# Patient Record
Sex: MALE | Race: WHITE | NOT HISPANIC OR LATINO | Employment: FULL TIME | URBAN - NONMETROPOLITAN AREA
[De-identification: names, ages, dates, MRNs, and addresses within clinical notes are randomized per-mention and may not be internally consistent; named-entity substitution may affect disease eponyms.]

---

## 2017-06-03 ENCOUNTER — HOSPITAL ENCOUNTER (EMERGENCY)
Facility: HOSPITAL | Age: 37
Discharge: HOME | End: 2017-06-03
Attending: FAMILY MEDICINE | Admitting: FAMILY MEDICINE

## 2017-06-03 VITALS
HEIGHT: 70 IN | BODY MASS INDEX: 32.21 KG/M2 | DIASTOLIC BLOOD PRESSURE: 104 MMHG | HEART RATE: 80 BPM | RESPIRATION RATE: 20 BRPM | OXYGEN SATURATION: 99 % | WEIGHT: 225 LBS | TEMPERATURE: 98.1 F | SYSTOLIC BLOOD PRESSURE: 150 MMHG

## 2017-06-03 DIAGNOSIS — I10 ESSENTIAL HYPERTENSION: Primary | ICD-10-CM

## 2017-06-03 LAB
ALBUMIN SERPL-MCNC: 4.9 G/DL (ref 3.5–5)
ALBUMIN/GLOB SERPL: 1.3 G/DL (ref 1.5–2.5)
ALP SERPL-CCNC: 88 U/L (ref 40–129)
ALT SERPL W P-5'-P-CCNC: 40 U/L (ref 10–44)
ANION GAP SERPL CALCULATED.3IONS-SCNC: 5.2 MMOL/L (ref 3.6–11.2)
AST SERPL-CCNC: 33 U/L (ref 10–34)
BASOPHILS # BLD AUTO: 0.24 10*3/MM3 (ref 0–0.3)
BASOPHILS NFR BLD AUTO: 2.2 % (ref 0–2)
BILIRUB SERPL-MCNC: 0.4 MG/DL (ref 0.2–1.8)
BNP SERPL-MCNC: <2 PG/ML (ref 0–100)
BUN BLD-MCNC: 15 MG/DL (ref 7–21)
BUN/CREAT SERPL: 12.6 (ref 7–25)
CALCIUM SPEC-SCNC: 9.9 MG/DL (ref 7.7–10)
CHLORIDE SERPL-SCNC: 108 MMOL/L (ref 99–112)
CO2 SERPL-SCNC: 26.8 MMOL/L (ref 24.3–31.9)
CREAT BLD-MCNC: 1.19 MG/DL (ref 0.43–1.29)
DEPRECATED RDW RBC AUTO: 42.7 FL (ref 37–54)
EOSINOPHIL # BLD AUTO: 0.44 10*3/MM3 (ref 0–0.7)
EOSINOPHIL NFR BLD AUTO: 4 % (ref 0–5)
ERYTHROCYTE [DISTWIDTH] IN BLOOD BY AUTOMATED COUNT: 13.5 % (ref 11.5–14.5)
GFR SERPL CREATININE-BSD FRML MDRD: 70 ML/MIN/1.73
GLOBULIN UR ELPH-MCNC: 3.7 GM/DL
GLUCOSE BLD-MCNC: 132 MG/DL (ref 70–110)
HCT VFR BLD AUTO: 49.4 % (ref 42–52)
HGB BLD-MCNC: 16.7 G/DL (ref 14–18)
IMM GRANULOCYTES # BLD: 0.1 10*3/MM3 (ref 0–0.03)
IMM GRANULOCYTES NFR BLD: 0.9 % (ref 0–0.5)
LYMPHOCYTES # BLD AUTO: 4.19 10*3/MM3 (ref 1–3)
LYMPHOCYTES NFR BLD AUTO: 38.1 % (ref 21–51)
MCH RBC QN AUTO: 29.2 PG (ref 27–33)
MCHC RBC AUTO-ENTMCNC: 33.8 G/DL (ref 33–37)
MCV RBC AUTO: 86.5 FL (ref 80–94)
MONOCYTES # BLD AUTO: 1.01 10*3/MM3 (ref 0.1–0.9)
MONOCYTES NFR BLD AUTO: 9.2 % (ref 0–10)
NEUTROPHILS # BLD AUTO: 5.02 10*3/MM3 (ref 1.4–6.5)
NEUTROPHILS NFR BLD AUTO: 45.6 % (ref 30–70)
OSMOLALITY SERPL CALC.SUM OF ELEC: 282.1 MOSM/KG (ref 273–305)
PLATELET # BLD AUTO: 328 10*3/MM3 (ref 130–400)
PMV BLD AUTO: 9.3 FL (ref 6–10)
POTASSIUM BLD-SCNC: 4.3 MMOL/L (ref 3.5–5.3)
PROT SERPL-MCNC: 8.6 G/DL (ref 6–8)
RBC # BLD AUTO: 5.71 10*6/MM3 (ref 4.7–6.1)
SODIUM BLD-SCNC: 140 MMOL/L (ref 135–153)
TROPONIN I SERPL-MCNC: <0.006 NG/ML
WBC NRBC COR # BLD: 11 10*3/MM3 (ref 4.5–12.5)

## 2017-06-03 PROCEDURE — 84484 ASSAY OF TROPONIN QUANT: CPT | Performed by: FAMILY MEDICINE

## 2017-06-03 PROCEDURE — 96374 THER/PROPH/DIAG INJ IV PUSH: CPT

## 2017-06-03 PROCEDURE — 80053 COMPREHEN METABOLIC PANEL: CPT | Performed by: FAMILY MEDICINE

## 2017-06-03 PROCEDURE — 25010000002 HYDRALAZINE PER 20 MG: Performed by: FAMILY MEDICINE

## 2017-06-03 PROCEDURE — 83880 ASSAY OF NATRIURETIC PEPTIDE: CPT | Performed by: FAMILY MEDICINE

## 2017-06-03 PROCEDURE — 99283 EMERGENCY DEPT VISIT LOW MDM: CPT

## 2017-06-03 PROCEDURE — 93010 ELECTROCARDIOGRAM REPORT: CPT | Performed by: INTERNAL MEDICINE

## 2017-06-03 PROCEDURE — 93005 ELECTROCARDIOGRAM TRACING: CPT | Performed by: FAMILY MEDICINE

## 2017-06-03 PROCEDURE — 85025 COMPLETE CBC W/AUTO DIFF WBC: CPT | Performed by: FAMILY MEDICINE

## 2017-06-03 RX ORDER — AMLODIPINE BESYLATE 5 MG/1
10 TABLET ORAL ONCE
Status: COMPLETED | OUTPATIENT
Start: 2017-06-03 | End: 2017-06-03

## 2017-06-03 RX ORDER — HYDROCHLOROTHIAZIDE 25 MG/1
25 TABLET ORAL DAILY
Qty: 30 TABLET | Refills: 0 | Status: SHIPPED | OUTPATIENT
Start: 2017-06-03 | End: 2017-07-03

## 2017-06-03 RX ORDER — HYDROCHLOROTHIAZIDE 25 MG/1
25 TABLET ORAL ONCE
Status: DISCONTINUED | OUTPATIENT
Start: 2017-06-03 | End: 2017-06-03

## 2017-06-03 RX ORDER — HYDRALAZINE HYDROCHLORIDE 20 MG/ML
10 INJECTION INTRAMUSCULAR; INTRAVENOUS ONCE
Status: COMPLETED | OUTPATIENT
Start: 2017-06-03 | End: 2017-06-03

## 2017-06-03 RX ORDER — SODIUM CHLORIDE 0.9 % (FLUSH) 0.9 %
10 SYRINGE (ML) INJECTION AS NEEDED
Status: DISCONTINUED | OUTPATIENT
Start: 2017-06-03 | End: 2017-06-03 | Stop reason: HOSPADM

## 2017-06-03 RX ADMIN — AMLODIPINE BESYLATE 10 MG: 5 TABLET ORAL at 01:16

## 2017-06-03 RX ADMIN — HYDRALAZINE HYDROCHLORIDE 10 MG: 20 INJECTION INTRAMUSCULAR; INTRAVENOUS at 01:14

## 2017-06-03 NOTE — ED PROVIDER NOTES
Subjective   History of Present Illness  36 y/o M here w/ c/o HTN. Pt has no h/o elevated BP in the past. Pt states that he has been under tremendous stress the past few weeks. Pt denies any MONACO or CP. Pt denies any health problems.   Review of Systems   HENT: Negative for trouble swallowing and voice change.    Eyes: Negative for photophobia and visual disturbance.   Respiratory: Negative for cough, chest tightness, shortness of breath and wheezing.    Cardiovascular: Negative for chest pain and palpitations.   Gastrointestinal: Negative for abdominal distention.   Genitourinary: Negative for difficulty urinating.   Musculoskeletal: Negative for neck pain and neck stiffness.   Skin: Negative for color change and pallor.   Neurological: Negative for syncope, facial asymmetry, speech difficulty, weakness, light-headedness, numbness and headaches.       No past medical history on file.    No Known Allergies    No past surgical history on file.    No family history on file.    Social History     Social History   • Marital status:      Spouse name: N/A   • Number of children: N/A   • Years of education: N/A     Social History Main Topics   • Smoking status: Not on file   • Smokeless tobacco: Not on file   • Alcohol use Not on file   • Drug use: Not on file   • Sexual activity: Not on file     Other Topics Concern   • Not on file     Social History Narrative           Objective   Physical Exam   Constitutional: He is oriented to person, place, and time. He appears well-developed and well-nourished. He is active.   HENT:   Head: Normocephalic and atraumatic.   Right Ear: Hearing and external ear normal.   Left Ear: Hearing and external ear normal.   Nose: Nose normal.   Mouth/Throat: Uvula is midline and oropharynx is clear and moist.   Eyes: Conjunctivae, EOM and lids are normal. Pupils are equal, round, and reactive to light.   Neck: Trachea normal, normal range of motion, full passive range of motion without pain  and phonation normal. Neck supple.   Cardiovascular: Normal rate, regular rhythm and normal heart sounds.    Pulmonary/Chest: Effort normal and breath sounds normal.   Abdominal: Soft. Normal appearance.   Neurological: He is alert and oriented to person, place, and time.   Skin: Skin is warm, dry and intact.   Psychiatric: He has a normal mood and affect. His speech is normal and behavior is normal. Judgment and thought content normal. Cognition and memory are normal.   Nursing note and vitals reviewed.      Procedures         ED Course  ED Course      Manual recheck of pt's BP was 200/112 after he was placed in a room. Pt BP recheck was 147/102. Pt tolerated medication well. Pt given prescription for HCTZ and instructions to check BP at home and f/u w/ PCP.            MDM  Number of Diagnoses or Management Options  Essential hypertension: new and requires workup     Amount and/or Complexity of Data Reviewed  Clinical lab tests: ordered and reviewed  Independent visualization of images, tracings, or specimens: yes    Risk of Complications, Morbidity, and/or Mortality  Presenting problems: high  Diagnostic procedures: high  Management options: high    Patient Progress  Patient progress: stable      Final diagnoses:   Essential hypertension            Jaguar Ellison MD  06/03/17 0130

## 2022-01-12 ENCOUNTER — OFFICE VISIT (OUTPATIENT)
Dept: UROLOGY | Facility: CLINIC | Age: 42
End: 2022-01-12

## 2022-01-12 VITALS
DIASTOLIC BLOOD PRESSURE: 92 MMHG | HEIGHT: 70 IN | SYSTOLIC BLOOD PRESSURE: 146 MMHG | WEIGHT: 210 LBS | BODY MASS INDEX: 30.06 KG/M2

## 2022-01-12 DIAGNOSIS — Z30.09 STERILIZATION CONSULT: Primary | ICD-10-CM

## 2022-01-12 PROCEDURE — 99203 OFFICE O/P NEW LOW 30 MIN: CPT | Performed by: UROLOGY

## 2022-01-12 NOTE — PROGRESS NOTES
Office Note Vasectomy Consult     Patient Name: Juan Antonio Smiley  : 1980   MRN: [id number]     Chief Complaint:  Elective Sterilization.   Chief Complaint   Patient presents with   • Vasectomy consult     New pt       Referring Provider: Referring, Self    History of Present Illness: Juan Antonio Smiley is a 41 y.o. male who presents to Urology today with the desire for irreversible, elective sterilization.  and has 1 child who is 8 years old.  She currently uses BC pills now for prophylaxis.    Has never had scrotal surgery.  No scrotal pain or discomfort.  NO hx of inguinal hernia repair.   No LUTS.  No ED.      Subjective      Review of Systems: Review of Systems   Constitutional: Negative for chills and fever.   HENT: Negative for sinus pain.    Respiratory: Negative for cough.    Cardiovascular: Negative for leg swelling.   Gastrointestinal: Negative for abdominal pain.   Genitourinary: Negative for dysuria, frequency, penile pain, penile swelling, scrotal swelling, testicular pain and urgency.   Musculoskeletal: Negative for back pain.   Neurological: Negative for headaches.   Psychiatric/Behavioral: The patient is not nervous/anxious.       I have reviewed the ROS documented by my clinical staff, I have updated appropriately and I agree. Kaz Solis MD    Past Medical History:   Past Medical History:   Diagnosis Date   • Hypertension        Past Surgical History:   Past Surgical History:   Procedure Laterality Date   • WISDOM TOOTH EXTRACTION         Family History:   Family History   Problem Relation Age of Onset   • Heart disease Other    • Hypertension Other    • Diabetes Other    • Cancer Other    • Prostate cancer Neg Hx        Social History:   Social History     Socioeconomic History   • Marital status:    Tobacco Use   • Smoking status: Never Smoker   • Smokeless tobacco: Never Used   Substance and Sexual Activity   • Alcohol use: No   • Drug use: No   • Sexual activity: Never  "      Medications:   No current outpatient medications on file.    Allergies:   No Known Allergies    IPSS Questionnaire (AUA-7):  Over the past month…    1)  Incomplete Emptying  How often have you had a sensation of not emptying your bladder?  0 - Not at all   2)  Frequency  How often have you had to urinate less than every two hours? 0 - Not at all   3)  Intermittency  How often have you found you stopped and started again several times when you urinated?  0 - Not at all   4) Urgency  How often have you found it difficult to postpone urination?  0 - Not at all   5) Weak Stream  How often have you had a weak urinary stream?  0 - Not at all   6) Straining  How often have you had to push or strain to begin urination?  0 - Not at all   7) Nocturia  How many times did you typically get up at night to urinate?  0 - None   Total Score:  0       Quality of life due to urinary symptoms:  If you were to spend the rest of your life with your urinary condition the way it is now, how would you feel about that? 0-DelightedP   Urine Leakage (Incontinence) 0-No Leakage       Objective     Physical Exam:   Vital Signs:   Vitals:    01/12/22 0847   BP: 146/92   Weight: 95.3 kg (210 lb)   Height: 177.8 cm (70\")     Body mass index is 30.13 kg/m².     Physical Exam  Vitals and nursing note reviewed.   Constitutional:       General: He is awake. He is not in acute distress.     Appearance: Normal appearance. He is well-developed. He is obese.   HENT:      Head: Normocephalic and atraumatic.      Right Ear: External ear normal.      Left Ear: External ear normal.      Nose: Nose normal.   Eyes:      Conjunctiva/sclera: Conjunctivae normal.   Pulmonary:      Effort: Pulmonary effort is normal.   Abdominal:      General: There is no distension.      Palpations: Abdomen is soft. There is no mass.      Tenderness: There is no abdominal tenderness. There is no right CVA tenderness, left CVA tenderness, guarding or rebound.      Hernia: No " hernia is present. There is no hernia in the left inguinal area or right inguinal area.   Genitourinary:     Pubic Area: No rash.       Penis: Normal.       Testes: Normal.      Rectum: No mass or tenderness. Normal anal tone.   Musculoskeletal:      Cervical back: Normal range of motion.   Lymphadenopathy:      Lower Body: No right inguinal adenopathy. No left inguinal adenopathy.   Skin:     General: Skin is warm.   Neurological:      General: No focal deficit present.      Mental Status: He is alert and oriented to person, place, and time.   Psychiatric:         Behavior: Behavior normal. Behavior is cooperative.         Labs:   Brief Urine Lab Results     None               Lab Results   Component Value Date    GLUCOSE 132 (H) 06/03/2017    CALCIUM 9.9 06/03/2017     06/03/2017    K 4.3 06/03/2017    CO2 26.8 06/03/2017     06/03/2017    BUN 15 06/03/2017    CREATININE 1.19 06/03/2017    EGFRIFNONA 70 06/03/2017    BCR 12.6 06/03/2017    ANIONGAP 5.2 06/03/2017       Lab Results   Component Value Date    WBC 11.00 06/03/2017    HGB 16.7 06/03/2017    HCT 49.4 06/03/2017    MCV 86.5 06/03/2017     06/03/2017       Images:   No Images in the past 120 days found..    Measures:   Tobacco:   Juan Antonio Smiley  reports that he has never smoked. He has never used smokeless tobacco..      Urine Incontinence: Patient reports that he is not currently experiencing any symptoms of urinary incontinence.         Assessment / Plan      Assessment/Plan:   Mr. Smiley is a 41 y.o. male who presented to clinic today for irreversible elective sterilization.  He would like to have this procedure done in the operating room.  He is currently taking care of his mother who underwent surgery in he would like to delay for now.  I advised him to call us whenever he is ready to have his procedure scheduled and we will move forward.      Diagnoses and all orders for this visit:    1. Sterilization consult (Primary)          Patient Education:   The patient has requested a vasectomy for elective sterilization and the risks and benefits of the procedure were explained at length. The procedure itself was explained and postop followup explained at this point.  We discussed he will need a  to take him home. I extensively reviewed with him the likely postoperative recuperative period as well as the need to continue to use contraception until he is notified by me of his sterility.     He will undergo a semen analysis 3 months post-procedure AND 20 ejaculations before his first semen analysis check. He understands the potential side effects of anesthesia, bleeding, scrotal hematoma, wound infection, epididymo-orchitis, epididymal congestion, chronic testicular pain requiring further intervention/surgery, sperm granuloma, recanalization and risk of sperm return and/or pregnancy  (1 in 2000 as per the AUA guidelines). The patient wishes to proceed with vasectomy.     Follow Up:   Return for Recheck, Follow up after surgery.    I spent approximately 30 minutes providing clinical care for this patient; including review of patient's chart and provider documentation, face to face time spent with patient in examination room (obtaining history, performing physical exam, discussing diagnosis and management options), placing orders, and completing patient documentation.     Kaz Solis MD  [facility]

## 2022-01-28 DIAGNOSIS — Z30.2 ENCOUNTER FOR VASECTOMY: Primary | ICD-10-CM

## 2022-01-28 RX ORDER — CEFAZOLIN SODIUM 2 G/50ML
2 SOLUTION INTRAVENOUS ONCE
Status: CANCELLED | OUTPATIENT
Start: 2022-01-28 | End: 2022-01-28

## 2022-01-31 DIAGNOSIS — Z30.09 STERILIZATION CONSULT: ICD-10-CM

## 2022-01-31 DIAGNOSIS — Z30.2 ENCOUNTER FOR VASECTOMY: Primary | ICD-10-CM

## 2022-02-07 DIAGNOSIS — Z30.2 ENCOUNTER FOR VASECTOMY: Primary | ICD-10-CM

## 2022-02-15 ENCOUNTER — LAB (OUTPATIENT)
Dept: LAB | Facility: HOSPITAL | Age: 42
End: 2022-02-15

## 2022-02-15 ENCOUNTER — PRE-ADMISSION TESTING (OUTPATIENT)
Dept: PREADMISSION TESTING | Facility: HOSPITAL | Age: 42
End: 2022-02-15

## 2022-02-15 DIAGNOSIS — Z30.2 ENCOUNTER FOR VASECTOMY: ICD-10-CM

## 2022-02-15 LAB
QT INTERVAL: 354 MS
QTC INTERVAL: 413 MS

## 2022-02-15 PROCEDURE — U0004 COV-19 TEST NON-CDC HGH THRU: HCPCS | Performed by: UROLOGY

## 2022-02-15 PROCEDURE — C9803 HOPD COVID-19 SPEC COLLECT: HCPCS

## 2022-02-15 PROCEDURE — 93005 ELECTROCARDIOGRAM TRACING: CPT

## 2022-02-15 RX ORDER — LISINOPRIL 10 MG/1
10 TABLET ORAL NIGHTLY
COMMUNITY
End: 2022-07-20

## 2022-02-15 NOTE — DISCHARGE INSTRUCTIONS
TAKE the following medications the morning of surgery:    All heart or blood pressure medications    Please discontinue all blood thinners and anticoagulants (except aspirin) prior to surgery as per your surgeon and cardiologist instructions.  Aspirin may be continued up to the day prior to surgery.    HOLD all diabetic medications the morning of surgery as order by physician.    Please follow instructions on use of prep cloths provided by nurse. Return instruction sheet to pre-op nurse on day of surgery.    Arrival time for surgery on 2/17/22 will be given to you by 's office.    A RESPONSIBLE PERSON MUST REMAIN IN THE WAITING ROOM DURING YOUR PROCEDURE AND A RESPONSIBLE  MUST BE AVAILABLE UPON YOUR DISCHARGE.    General Instructions:  • Do NOT eat or drink after midnight 2/16/22 which includes water, mints, or gum.  • You may brush your teeth. Dental appliances that are removable must be taken out day of surgery.  • Do NOT smoke, chew tobacco, or drink alcohol within 24 hours prior to surgery.  • Bring medications in original bottles, any inhalers and if applicable your C-PAP/BI-PAP machine  • Bring any papers given to you in the doctor’s office  • Wear clean, comfortable clothes and socks  • Do NOT wear contact lenses or make-up or dark nail polish.  Bring a case for your glasses if applicable.  • Bring crutches or walker if applicable  • Leave all other valuables and jewelry at home  • If you were given a blood bank armband, continue to wear it until discharged.    Preventing a Surgical Site Infection:  • Shower the night before surgery (unless instructed otherwise) using a fresh bar of anti-bacterial soap (such as Dial) and clean washcloth.  Dry with a clean towel and dress in clean clothing.  • For 2 to 3 days before surgery, avoid shaving with a razor near where you will have surgery because the razor can irritate skin and make it easier to develop an infection.  Ask your surgeon if you will  be receiving antibiotics prior to surgery.  • Make sure you, your family, and all healthcare providers clean their hands with soap and water or an alcohol-based hand  before caring for you or your wound.  • If at all possible, quit smoking as many days before surgery as you can.    Day of Surgery:  Upon arrival, a pre-op nurse and anesthesiologist will review your health history, obtain vital signs, and answer questions you may have.  The only belongings needed at this time will be your home medications and if applicable you C-PAP/BI-PAP machine.  If you are staying overnight, your family can leave the rest of your belongings in the car and bring them to your room later.  A pre-op nurse will start an IV and you may receive medication in preparation for surgery.  Due to patient privacy and limited space, only one member of your family will be able to accompany you in the pre-op area.  While you are in surgery your family should notify the waiting room  if they leave the waiting room area and provide a contact number.  Please be aware that surgery does come with discomfort.  We want to make every effort to control your discomfort so please discuss any uncontrolled symptoms with your nurse.  Your doctor will most likely have prescribed pain medications.  If you are going home after surgery you will receive individualized written care instructions before being discharged.  A responsible adult must drive you to and from the hospital on the day of surgery and stay with you for 24 hours.  If you are staying overnight following surgery, you will be transported to your hospital room following the recovery period.

## 2022-02-16 LAB — SARS-COV-2 RNA PNL SPEC NAA+PROBE: NOT DETECTED

## 2022-02-17 ENCOUNTER — ANESTHESIA EVENT (OUTPATIENT)
Dept: PERIOP | Facility: HOSPITAL | Age: 42
End: 2022-02-17

## 2022-02-17 ENCOUNTER — HOSPITAL ENCOUNTER (OUTPATIENT)
Facility: HOSPITAL | Age: 42
Setting detail: HOSPITAL OUTPATIENT SURGERY
Discharge: HOME | End: 2022-02-17
Attending: UROLOGY | Admitting: UROLOGY

## 2022-02-17 ENCOUNTER — ANESTHESIA (OUTPATIENT)
Dept: PERIOP | Facility: HOSPITAL | Age: 42
End: 2022-02-17

## 2022-02-17 VITALS
OXYGEN SATURATION: 98 % | HEIGHT: 70 IN | DIASTOLIC BLOOD PRESSURE: 88 MMHG | BODY MASS INDEX: 32.07 KG/M2 | WEIGHT: 224 LBS | RESPIRATION RATE: 18 BRPM | SYSTOLIC BLOOD PRESSURE: 132 MMHG | TEMPERATURE: 97.9 F | HEART RATE: 84 BPM

## 2022-02-17 DIAGNOSIS — Z30.2 ENCOUNTER FOR VASECTOMY: ICD-10-CM

## 2022-02-17 PROCEDURE — 55250 REMOVAL OF SPERM DUCT(S): CPT | Performed by: UROLOGY

## 2022-02-17 PROCEDURE — 25010000002 MIDAZOLAM PER 1 MG: Performed by: NURSE ANESTHETIST, CERTIFIED REGISTERED

## 2022-02-17 PROCEDURE — 0 CEFAZOLIN SODIUM-DEXTROSE 2-3 GM-%(50ML) RECONSTITUTED SOLUTION: Performed by: UROLOGY

## 2022-02-17 PROCEDURE — 25010000002 FENTANYL CITRATE (PF) 50 MCG/ML SOLUTION: Performed by: NURSE ANESTHETIST, CERTIFIED REGISTERED

## 2022-02-17 PROCEDURE — 25010000002 MIDAZOLAM PER 1 MG: Performed by: ANESTHESIOLOGY

## 2022-02-17 PROCEDURE — S0260 H&P FOR SURGERY: HCPCS | Performed by: UROLOGY

## 2022-02-17 PROCEDURE — C1889 IMPLANT/INSERT DEVICE, NOC: HCPCS | Performed by: UROLOGY

## 2022-02-17 PROCEDURE — 0 LIDOCAINE 1 % SOLUTION 20 ML VIAL: Performed by: UROLOGY

## 2022-02-17 PROCEDURE — 25010000002 ONDANSETRON PER 1 MG: Performed by: NURSE ANESTHETIST, CERTIFIED REGISTERED

## 2022-02-17 DEVICE — LIGACLIP EXTRA LIGATING CLIP CARTRIDGES: 6 TITANIUM CLIPS/ CARTRIDGE (MEDIUM)
Type: IMPLANTABLE DEVICE | Site: SCROTUM | Status: FUNCTIONAL
Brand: LIGACLIP

## 2022-02-17 RX ORDER — ONDANSETRON 2 MG/ML
4 INJECTION INTRAMUSCULAR; INTRAVENOUS AS NEEDED
Status: DISCONTINUED | OUTPATIENT
Start: 2022-02-17 | End: 2022-02-17 | Stop reason: HOSPADM

## 2022-02-17 RX ORDER — SODIUM CHLORIDE, SODIUM LACTATE, POTASSIUM CHLORIDE, CALCIUM CHLORIDE 600; 310; 30; 20 MG/100ML; MG/100ML; MG/100ML; MG/100ML
INJECTION, SOLUTION INTRAVENOUS CONTINUOUS PRN
Status: DISCONTINUED | OUTPATIENT
Start: 2022-02-17 | End: 2022-02-17 | Stop reason: SURG

## 2022-02-17 RX ORDER — OXYCODONE HYDROCHLORIDE AND ACETAMINOPHEN 5; 325 MG/1; MG/1
1 TABLET ORAL ONCE AS NEEDED
Status: DISCONTINUED | OUTPATIENT
Start: 2022-02-17 | End: 2022-02-17 | Stop reason: HOSPADM

## 2022-02-17 RX ORDER — CEFAZOLIN SODIUM 2 G/50ML
2 SOLUTION INTRAVENOUS ONCE
Status: COMPLETED | OUTPATIENT
Start: 2022-02-17 | End: 2022-02-17

## 2022-02-17 RX ORDER — SODIUM CHLORIDE, SODIUM LACTATE, POTASSIUM CHLORIDE, CALCIUM CHLORIDE 600; 310; 30; 20 MG/100ML; MG/100ML; MG/100ML; MG/100ML
125 INJECTION, SOLUTION INTRAVENOUS ONCE
Status: COMPLETED | OUTPATIENT
Start: 2022-02-17 | End: 2022-02-17

## 2022-02-17 RX ORDER — TRAMADOL HYDROCHLORIDE 50 MG/1
50 TABLET ORAL EVERY 6 HOURS PRN
Qty: 12 TABLET | Refills: 0 | Status: SHIPPED | OUTPATIENT
Start: 2022-02-17 | End: 2022-02-20

## 2022-02-17 RX ORDER — MIDAZOLAM HYDROCHLORIDE 1 MG/ML
INJECTION INTRAMUSCULAR; INTRAVENOUS AS NEEDED
Status: DISCONTINUED | OUTPATIENT
Start: 2022-02-17 | End: 2022-02-17 | Stop reason: SURG

## 2022-02-17 RX ORDER — MAGNESIUM HYDROXIDE 1200 MG/15ML
LIQUID ORAL AS NEEDED
Status: DISCONTINUED | OUTPATIENT
Start: 2022-02-17 | End: 2022-02-17 | Stop reason: HOSPADM

## 2022-02-17 RX ORDER — ONDANSETRON 2 MG/ML
INJECTION INTRAMUSCULAR; INTRAVENOUS AS NEEDED
Status: DISCONTINUED | OUTPATIENT
Start: 2022-02-17 | End: 2022-02-17 | Stop reason: SURG

## 2022-02-17 RX ORDER — MEPERIDINE HYDROCHLORIDE 25 MG/ML
12.5 INJECTION INTRAMUSCULAR; INTRAVENOUS; SUBCUTANEOUS
Status: DISCONTINUED | OUTPATIENT
Start: 2022-02-17 | End: 2022-02-17 | Stop reason: HOSPADM

## 2022-02-17 RX ORDER — FENTANYL CITRATE 50 UG/ML
INJECTION, SOLUTION INTRAMUSCULAR; INTRAVENOUS AS NEEDED
Status: DISCONTINUED | OUTPATIENT
Start: 2022-02-17 | End: 2022-02-17 | Stop reason: SURG

## 2022-02-17 RX ORDER — SODIUM CHLORIDE 0.9 % (FLUSH) 0.9 %
10 SYRINGE (ML) INJECTION AS NEEDED
Status: DISCONTINUED | OUTPATIENT
Start: 2022-02-17 | End: 2022-02-17 | Stop reason: HOSPADM

## 2022-02-17 RX ORDER — MIDAZOLAM HYDROCHLORIDE 1 MG/ML
1 INJECTION INTRAMUSCULAR; INTRAVENOUS
Status: COMPLETED | OUTPATIENT
Start: 2022-02-17 | End: 2022-02-17

## 2022-02-17 RX ORDER — FENTANYL CITRATE 50 UG/ML
50 INJECTION, SOLUTION INTRAMUSCULAR; INTRAVENOUS
Status: DISCONTINUED | OUTPATIENT
Start: 2022-02-17 | End: 2022-02-17 | Stop reason: HOSPADM

## 2022-02-17 RX ORDER — SODIUM CHLORIDE, SODIUM LACTATE, POTASSIUM CHLORIDE, CALCIUM CHLORIDE 600; 310; 30; 20 MG/100ML; MG/100ML; MG/100ML; MG/100ML
100 INJECTION, SOLUTION INTRAVENOUS ONCE AS NEEDED
Status: DISCONTINUED | OUTPATIENT
Start: 2022-02-17 | End: 2022-02-17 | Stop reason: HOSPADM

## 2022-02-17 RX ORDER — DROPERIDOL 2.5 MG/ML
0.62 INJECTION, SOLUTION INTRAMUSCULAR; INTRAVENOUS ONCE AS NEEDED
Status: DISCONTINUED | OUTPATIENT
Start: 2022-02-17 | End: 2022-02-17 | Stop reason: HOSPADM

## 2022-02-17 RX ORDER — KETOROLAC TROMETHAMINE 30 MG/ML
30 INJECTION, SOLUTION INTRAMUSCULAR; INTRAVENOUS EVERY 6 HOURS PRN
Status: DISCONTINUED | OUTPATIENT
Start: 2022-02-17 | End: 2022-02-17 | Stop reason: HOSPADM

## 2022-02-17 RX ORDER — FAMOTIDINE 10 MG/ML
INJECTION, SOLUTION INTRAVENOUS AS NEEDED
Status: DISCONTINUED | OUTPATIENT
Start: 2022-02-17 | End: 2022-02-17 | Stop reason: SURG

## 2022-02-17 RX ORDER — SODIUM CHLORIDE 0.9 % (FLUSH) 0.9 %
10 SYRINGE (ML) INJECTION EVERY 12 HOURS SCHEDULED
Status: DISCONTINUED | OUTPATIENT
Start: 2022-02-17 | End: 2022-02-17 | Stop reason: HOSPADM

## 2022-02-17 RX ORDER — IPRATROPIUM BROMIDE AND ALBUTEROL SULFATE 2.5; .5 MG/3ML; MG/3ML
3 SOLUTION RESPIRATORY (INHALATION) ONCE AS NEEDED
Status: DISCONTINUED | OUTPATIENT
Start: 2022-02-17 | End: 2022-02-17 | Stop reason: HOSPADM

## 2022-02-17 RX ADMIN — FAMOTIDINE 20 MG: 10 INJECTION INTRAVENOUS at 07:46

## 2022-02-17 RX ADMIN — ONDANSETRON 4 MG: 2 INJECTION INTRAMUSCULAR; INTRAVENOUS at 07:46

## 2022-02-17 RX ADMIN — CEFAZOLIN SODIUM 2 G: 2 SOLUTION INTRAVENOUS at 07:34

## 2022-02-17 RX ADMIN — SODIUM CHLORIDE, POTASSIUM CHLORIDE, SODIUM LACTATE AND CALCIUM CHLORIDE: 600; 310; 30; 20 INJECTION, SOLUTION INTRAVENOUS at 07:06

## 2022-02-17 RX ADMIN — MIDAZOLAM 1 MG: 1 INJECTION INTRAMUSCULAR; INTRAVENOUS at 07:16

## 2022-02-17 RX ADMIN — MIDAZOLAM 1 MG: 1 INJECTION INTRAMUSCULAR; INTRAVENOUS at 07:06

## 2022-02-17 RX ADMIN — FENTANYL CITRATE 100 MCG: 50 INJECTION INTRAMUSCULAR; INTRAVENOUS at 07:34

## 2022-02-17 RX ADMIN — SODIUM CHLORIDE, POTASSIUM CHLORIDE, SODIUM LACTATE AND CALCIUM CHLORIDE 125 ML/HR: 600; 310; 30; 20 INJECTION, SOLUTION INTRAVENOUS at 07:05

## 2022-02-17 RX ADMIN — MIDAZOLAM 2 MG: 1 INJECTION INTRAMUSCULAR; INTRAVENOUS at 07:34

## 2022-02-17 NOTE — ANESTHESIA PROCEDURE NOTES
Airway  Urgency: elective    Date/Time: 2/17/2022 7:41 AM  End Time:2/17/2022 7:41 AM    General Information and Staff    Patient location during procedure: OR  CRNA: Neal Childress CRNA    Indications and Patient Condition  Indications for airway management: airway protection    Preoxygenated: yes  MILS maintained throughout  Mask difficulty assessment: 0 - not attempted    Final Airway Details  Final airway type: supraglottic airway      Successful airway: unique  Size 4  Airway Seal Pressure (cm H2O): 20    Number of attempts at approach: 1  Assessment: lips, teeth, and gum same as pre-op and atraumatic intubation    Additional Comments  Atraumatic

## 2022-02-17 NOTE — ANESTHESIA POSTPROCEDURE EVALUATION
Patient: Juan Antonio Smiley    Procedure Summary     Date: 02/17/22 Room / Location: [facility] OR 08 /  [facility] OR    Anesthesia Start: 0734 Anesthesia Stop: 0823    Procedure: VASECTOMY (Bilateral Scrotum) Diagnosis:       Encounter for vasectomy      (Encounter for vasectomy [Z30.2])    Surgeons: Kaz Solis MD Provider: Santosh Bell MD    Anesthesia Type: general ASA Status: 2          Anesthesia Type: general    Vitals  Vitals Value Taken Time   /84 02/17/22 0831   Temp 97.8 °F (36.6 °C) 02/17/22 0826   Pulse 78 02/17/22 0831   Resp 17 02/17/22 0831   SpO2 95 % 02/17/22 0831           Post Anesthesia Care and Evaluation    Patient location during evaluation: PACU  Patient participation: complete - patient participated  Level of consciousness: responsive to verbal stimuli  Pain score: 0  Pain management: adequate  Airway patency: patent  Anesthetic complications: No anesthetic complications  PONV Status: none  Cardiovascular status: hemodynamically stable  Respiratory status: nasal cannula  Hydration status: acceptable

## 2022-02-17 NOTE — OP NOTE
VASECTOMY  Procedure Note    Juan Antonio Smiley  2/17/2022    Pre-op Diagnosis:   Encounter for vasectomy [Z30.2]    Post-op Diagnosis:     Post-Op Diagnosis Codes:     * Encounter for vasectomy [Z30.2]    Procedure/CPT® Codes:      Procedure(s):  VASECTOMY    Surgeon(s):  Kaz Solis MD    Anesthesia: see anesthesia record    Staff:   Circulator: Christophe Kothari RN  Scrub Person: Carla Malik; Gavi Osorio    Estimated Blood Loss: minimal  Urine Voided: * No values recorded between 2/17/2022  7:36 AM and 2/17/2022  8:17 AM *    Specimens:                ID Type Source Tests Collected by Time   A : bilateral vas deferens  Tissue Scrotum TISSUE PATHOLOGY EXAM Kaz Solis MD 2/17/2022 0759         Drains: None    Findings:   1.  Normal b/l vasectomy  2.  Complete vasectomy bilaterally    Blood: N/A    Complications: None immediate     Grafts and Implants: None    Indication for Procedure:   Mr. Smiley is a 41 y.o. gentleman who is here today for elective sterilization.  He is  with 1 child who is 8 years old.  They currently use birth control for prophylaxis.  He and his wife have discussed this extensively and they would like to move forward with vasectomy today.    Description of Procedure:  The patient was seen and examined in the preoperative area. Informed consent was obtained. He was then taken to the operating theater and placed on table in supine position. Venodyne boots were placed on bilateral lower extremities. General anesthesia was then induced without any complications. He was then prepped and draped in the usual sterile fashion. A timeout was then taken.     We began the procedure by performing a cord block. 10 cc of local anesthesia were infiltrated into both cords. Once this was accomplished the right vas was isolated and a incision was made overlying the vas. The vas was then grasped using a ring clamp and brought out through the incision. The overlying sheath was then dissected  off of the vas. Once the vas was completely cleaned off medium size clips were placed on the proximal and distal ends. The vas was then cut and a small specimen was passed off. Each end was then burned.    The same procedure was then performed on the contralateral side. Both vas were then dunked back into the scrotum. The area was inspected for hemostasis which was excellent. 3-0 chromic was then used to close the incision. A dressing of Dermabond was then placed on each side. The patient tolerated the procedure well and there were no complications to the procedure. He was taken to PACU in stable extubated condition.      Disposition:  - Semen analysis in 12 weeks  - Contraception needs used prior to confirmation of no sperm in ejaculation in 12 weeks  - Hold prescribed blood thinners another 2 days  - Use tylenol and/or ibuprofen as needed for pain  - Ice packs to scrotum today and no strenuous activity for 7 days    Kaz Solis MD     Date: 2/17/2022  Time: 08:18 EST

## 2022-02-17 NOTE — H&P
Office Note Vasectomy Consult      Patient Name: Juan Antonio Smiley  : 1980   MRN: [id number]      Chief Complaint:  Elective Sterilization.        Chief Complaint   Patient presents with   • Vasectomy consult       New pt         Referring Provider: Referring, Self     History of Present Illness: Juan Antonio Smiley is a 41 y.o. male who presents to Urology today with the desire for irreversible, elective sterilization.  and has 1 child who is 8 years old.  She currently uses BC pills now for prophylaxis.    Has never had scrotal surgery.  No scrotal pain or discomfort.  NO hx of inguinal hernia repair.   No LUTS.  No ED.    No change to his health.  He is here today for vasectomy.      Subjective      Review of Systems: Review of Systems   Constitutional: Negative for chills and fever.   HENT: Negative for sinus pain.    Respiratory: Negative for cough.    Cardiovascular: Negative for leg swelling.   Gastrointestinal: Negative for abdominal pain.   Genitourinary: Negative for dysuria, frequency, penile pain, penile swelling, scrotal swelling, testicular pain and urgency.   Musculoskeletal: Negative for back pain.   Neurological: Negative for headaches.   Psychiatric/Behavioral: The patient is not nervous/anxious.       I have reviewed the ROS documented by my clinical staff, I have updated appropriately and I agree. Kaz Solis MD     Past Medical History:   Medical History        Past Medical History:   Diagnosis Date   • Hypertension              Past Surgical History:   Surgical History         Past Surgical History:   Procedure Laterality Date   • WISDOM TOOTH EXTRACTION                Family History:         Family History   Problem Relation Age of Onset   • Heart disease Other     • Hypertension Other     • Diabetes Other     • Cancer Other     • Prostate cancer Neg Hx           Social History:   Social History   Social History           Socioeconomic History   • Marital status:  "   Tobacco Use   • Smoking status: Never Smoker   • Smokeless tobacco: Never Used   Substance and Sexual Activity   • Alcohol use: No   • Drug use: No   • Sexual activity: Never            Medications:   No current outpatient medications on file.     Allergies:   No Known Allergies     IPSS Questionnaire (AUA-7):  Over the past month…     1)  Incomplete Emptying  How often have you had a sensation of not emptying your bladder?  0 - Not at all   2)  Frequency  How often have you had to urinate less than every two hours? 0 - Not at all   3)  Intermittency  How often have you found you stopped and started again several times when you urinated?  0 - Not at all   4) Urgency  How often have you found it difficult to postpone urination?  0 - Not at all   5) Weak Stream  How often have you had a weak urinary stream?  0 - Not at all   6) Straining  How often have you had to push or strain to begin urination?  0 - Not at all   7) Nocturia  How many times did you typically get up at night to urinate?  0 - None   Total Score:  0         Quality of life due to urinary symptoms:  If you were to spend the rest of your life with your urinary condition the way it is now, how would you feel about that? 0-DelightedP   Urine Leakage (Incontinence) 0-No Leakage         Objective      Physical Exam:   Vital Signs:   Vitals       Vitals:     01/12/22 0847   BP: 146/92   Weight: 95.3 kg (210 lb)   Height: 177.8 cm (70\")         Body mass index is 30.13 kg/m².      Physical Exam  Vitals and nursing note reviewed.   Constitutional:       General: He is awake. He is not in acute distress.     Appearance: Normal appearance. He is well-developed. He is obese.   HENT:      Head: Normocephalic and atraumatic.      Right Ear: External ear normal.      Left Ear: External ear normal.      Nose: Nose normal.   Eyes:      Conjunctiva/sclera: Conjunctivae normal.   Pulmonary:      Effort: Pulmonary effort is normal.   Abdominal:      General: " There is no distension.      Palpations: Abdomen is soft. There is no mass.      Tenderness: There is no abdominal tenderness. There is no right CVA tenderness, left CVA tenderness, guarding or rebound.      Hernia: No hernia is present. There is no hernia in the left inguinal area or right inguinal area.   Genitourinary:     Pubic Area: No rash.       Penis: Normal.       Testes: Normal.      Rectum: No mass or tenderness. Normal anal tone.   Musculoskeletal:      Cervical back: Normal range of motion.   Lymphadenopathy:      Lower Body: No right inguinal adenopathy. No left inguinal adenopathy.   Skin:     General: Skin is warm.   Neurological:      General: No focal deficit present.      Mental Status: He is alert and oriented to person, place, and time.   Psychiatric:         Behavior: Behavior normal. Behavior is cooperative.            Labs:       Brief Urine Lab Results      None             Urine Culture Results:                  Lab Results   Component Value Date     GLUCOSE 132 (H) 06/03/2017     CALCIUM 9.9 06/03/2017      06/03/2017     K 4.3 06/03/2017     CO2 26.8 06/03/2017      06/03/2017     BUN 15 06/03/2017     CREATININE 1.19 06/03/2017     EGFRIFNONA 70 06/03/2017     BCR 12.6 06/03/2017     ANIONGAP 5.2 06/03/2017               Lab Results   Component Value Date     WBC 11.00 06/03/2017     HGB 16.7 06/03/2017     HCT 49.4 06/03/2017     MCV 86.5 06/03/2017      06/03/2017         Images:   No Images in the past 120 days found..     Measures:   Tobacco:   Juan Antonio Smiley  reports that he has never smoked. He has never used smokeless tobacco..        Urine Incontinence: Patient reports that he is not currently experiencing any symptoms of urinary incontinence.           Assessment / Plan       Assessment/Plan:   Mr. Smiley is a 41 y.o. male who presented to clinic today for irreversible elective sterilization.       Diagnoses and all orders for this visit:     1. Sterilization  consult (Primary)      - OR today for elective vasectomy.      Patient Education:   The patient has requested a vasectomy for elective sterilization and the risks and benefits of the procedure were explained at length. The procedure itself was explained and postop followup explained at this point.  We discussed he will need a  to take him home. I extensively reviewed with him the likely postoperative recuperative period as well as the need to continue to use contraception until he is notified by me of his sterility.      He will undergo a semen analysis 3 months post-procedure AND 20 ejaculations before his first semen analysis check. He understands the potential side effects of anesthesia, bleeding, scrotal hematoma, wound infection, epididymo-orchitis, epididymal congestion, chronic testicular pain requiring further intervention/surgery, sperm granuloma, recanalization and risk of sperm return and/or pregnancy  (1 in 2000 as per the AUA guidelines). The patient wishes to proceed with vasectomy.           Kaz Solis MD  [facility]

## 2022-02-21 ENCOUNTER — OFFICE VISIT (OUTPATIENT)
Dept: UROLOGY | Facility: CLINIC | Age: 42
End: 2022-02-21

## 2022-02-21 VITALS
DIASTOLIC BLOOD PRESSURE: 87 MMHG | HEART RATE: 88 BPM | BODY MASS INDEX: 32.07 KG/M2 | WEIGHT: 224 LBS | HEIGHT: 70 IN | RESPIRATION RATE: 18 BRPM | SYSTOLIC BLOOD PRESSURE: 130 MMHG | OXYGEN SATURATION: 98 %

## 2022-02-21 DIAGNOSIS — N50.82 SCROTAL PAIN: Primary | ICD-10-CM

## 2022-02-21 LAB
LAB AP CASE REPORT: NORMAL
PATH REPORT.FINAL DX SPEC: NORMAL

## 2022-02-21 PROCEDURE — 99024 POSTOP FOLLOW-UP VISIT: CPT | Performed by: UROLOGY

## 2022-02-21 RX ORDER — OXYCODONE HYDROCHLORIDE AND ACETAMINOPHEN 5; 325 MG/1; MG/1
1 TABLET ORAL EVERY 6 HOURS PRN
Qty: 15 TABLET | Refills: 0 | Status: SHIPPED | OUTPATIENT
Start: 2022-02-21 | End: 2022-07-20

## 2022-02-21 RX ORDER — LISINOPRIL 10 MG/1
1 TABLET ORAL DAILY
COMMUNITY
Start: 2022-01-20 | End: 2022-07-20

## 2022-02-21 NOTE — PROGRESS NOTES
Follow Up Office Visit      Patient Name: Juan Antonio Smiley  : 1980   MRN: [id number]     Chief Complaint:    Chief Complaint   Patient presents with   • Sterilization     follow up        Referring Provider: No ref. provider found    History of Present Illness: Juan Antonio Smiley is a 41 y.o. male who presents today for follow up after vasectomy.  He reports pain on his left side.  He states that since the day of his procedure it has stayed swollen.  He denies any erythema or drainage.  He came in today because he was concerned about the swelling.  He has been using a scrotal support as well as Tylenol Motrin but reports that it does not really help his pain.  He has also been using ice.  He also reports pressure in the left groin.      Subjective      Review of System: Review of Systems   Constitutional: Negative.    HENT: Negative.    Eyes: Negative.    Respiratory: Negative.    Cardiovascular: Negative.    Gastrointestinal: Negative.    Endocrine: Negative.    Genitourinary: Positive for scrotal swelling and testicular pain.   Musculoskeletal: Negative.    Skin: Negative.    Allergic/Immunologic: Negative.    Neurological: Negative.    Hematological: Negative.       I have reviewed the ROS documented by my clinical staff, I have updated appropriately and I agree. Kaz Solis MD    I have reviewed and the following portions of the patient's history were updated as appropriate: past family history, past medical history, past social history, past surgical history and problem list.    Past Medical History:   Past Medical History:   Diagnosis Date   • Anxiety    • History of COVID-19    • Hypertension        Past Surgical History:  Past Surgical History:   Procedure Laterality Date   • VASECTOMY Bilateral 2022    Procedure: VASECTOMY;  Surgeon: Kaz Solis MD;  [facility];  Service: Urology;  Laterality: Bilateral;   • WISDOM TOOTH EXTRACTION         Family History:   family history includes  Cancer in an other family member; Diabetes in an other family member; Heart disease in an other family member; Hypertension in an other family member.   Otherwise pertinent FHx was reviewed and not pertinent to current issue.    Social History:    reports that he has never smoked. He has quit using smokeless tobacco.  His smokeless tobacco use included chew. He reports that he does not drink alcohol and does not use drugs.    Medications:     Current Outpatient Medications:   •  lisinopril (PRINIVIL,ZESTRIL) 10 MG tablet, Take 10 mg by mouth Every Night., Disp: , Rfl:   •  lisinopril (PRINIVIL,ZESTRIL) 10 MG tablet, Take 1 tablet by mouth Daily., Disp: , Rfl:   •  oxyCODONE-acetaminophen (PERCOCET) 5-325 MG per tablet, Take 1 tablet by mouth Every 6 (Six) Hours As Needed for Severe Pain ., Disp: 15 tablet, Rfl: 0    Allergies:   No Known Allergies    IPSS Questionnaire (AUA-7):  Over the past month…    1)  Incomplete Emptying  How often have you had a sensation of not emptying your bladder?  0 - Not at all   2)  Frequency  How often have you had to urinate less than every two hours? 0 - Not at all   3)  Intermittency  How often have you found you stopped and started again several times when you urinated?  0 - Not at all   4) Urgency  How often have you found it difficult to postpone urination?  0 - Not at all   5) Weak Stream  How often have you had a weak urinary stream?  0 - Not at all   6) Straining  How often have you had to push or strain to begin urination?  0 - Not at all   7) Nocturia  How many times did you typically get up at night to urinate?  1 - 1 time   Total Score:  1   The International Prostate Symptom Score (IPSS) is used to screen, diagnose, track symptoms of benign prostatic hyperplasia (BPH).    0-7 pts (Mild Symptoms)  / 8-19 pts (Moderate) / 20-35 (Severe)    Quality of life due to urinary symptoms:  If you were to spend the rest of your life with your urinary condition the way it is now, how  "would you feel about that? 2-Mostly Satisfied   Urine Leakage (Incontinence) 0-No Leakage         Objective     Physical Exam:   Vital Signs:   Vitals:    02/21/22 1012   BP: 130/87   BP Location: Right arm   Patient Position: Sitting   Cuff Size: Adult   Pulse: 88   Resp: 18   SpO2: 98%   Weight: 102 kg (224 lb)   Height: 177.8 cm (70\")     Body mass index is 32.14 kg/m².     Physical Exam  Vitals and nursing note reviewed.   Constitutional:       General: He is awake. He is not in acute distress.     Appearance: Normal appearance. He is well-developed.   HENT:      Head: Normocephalic and atraumatic.      Right Ear: External ear normal.      Left Ear: External ear normal.      Nose: Nose normal.   Eyes:      Conjunctiva/sclera: Conjunctivae normal.   Pulmonary:      Effort: Pulmonary effort is normal.   Abdominal:      General: There is no distension.      Palpations: Abdomen is soft. There is no mass.      Tenderness: There is no abdominal tenderness. There is no right CVA tenderness, left CVA tenderness, guarding or rebound.      Hernia: No hernia is present. There is no hernia in the left inguinal area or right inguinal area.   Genitourinary:     Pubic Area: No rash.       Penis: Normal.       Testes: Normal.      Rectum: No mass or tenderness. Normal anal tone.      Comments: Small left hematoma.  No erythema.  Mild tenderness.    Musculoskeletal:      Cervical back: Normal range of motion.   Lymphadenopathy:      Lower Body: No right inguinal adenopathy. No left inguinal adenopathy.   Skin:     General: Skin is warm.   Neurological:      General: No focal deficit present.      Mental Status: He is alert and oriented to person, place, and time.   Psychiatric:         Behavior: Behavior normal. Behavior is cooperative.         Labs:   Brief Urine Lab Results     None               Lab Results   Component Value Date    GLUCOSE 132 (H) 06/03/2017    CALCIUM 9.9 06/03/2017     06/03/2017    K 4.3 06/03/2017 "    CO2 26.8 06/03/2017     06/03/2017    BUN 15 06/03/2017    CREATININE 1.19 06/03/2017    EGFRIFNONA 70 06/03/2017    BCR 12.6 06/03/2017    ANIONGAP 5.2 06/03/2017       Lab Results   Component Value Date    WBC 11.00 06/03/2017    HGB 16.7 06/03/2017    HCT 49.4 06/03/2017    MCV 86.5 06/03/2017     06/03/2017       No results found for: PSA    Images:   No Images in the past 120 days found..      Measures:   Tobacco:   Juan Antonio Smiley  reports that he has never smoked. He has quit using smokeless tobacco.  His smokeless tobacco use included chew.    Urine Incontinence: Patient reports that he is not currently experiencing any symptoms of urinary incontinence.      Assessment / Plan      Assessment/Plan:   41 y.o. male who presented today for follow up after vasectomy.  It appears that he has developed a small hematoma.  I advised him to use warm compresses to help dissolve the hematoma.  Continue to use ice as needed for pain.  I will send him with some Percocet for pain as needed.  I will have him follow-up in 2 weeks.    Diagnoses and all orders for this visit:    1. Scrotal pain (Primary)  -     oxyCODONE-acetaminophen (PERCOCET) 5-325 MG per tablet; Take 1 tablet by mouth Every 6 (Six) Hours As Needed for Severe Pain .  Dispense: 15 tablet; Refill: 0           Follow Up:   Return in about 2 weeks (around 3/7/2022) for Recheck.    I spent approximately 15 minutes providing clinical care for this patient; including review of patient's chart and provider documentation, face to face time spent with patient in examination room (obtaining history, performing physical exam, discussing diagnosis and management options), placing orders, and completing patient documentation.     Kaz Solis MD  [facility]

## 2022-03-11 ENCOUNTER — OFFICE VISIT (OUTPATIENT)
Dept: UROLOGY | Facility: CLINIC | Age: 42
End: 2022-03-11

## 2022-03-11 VITALS — WEIGHT: 224.87 LBS | HEIGHT: 70 IN | BODY MASS INDEX: 32.19 KG/M2

## 2022-03-11 DIAGNOSIS — Z30.2 STERILIZATION: Primary | ICD-10-CM

## 2022-03-11 LAB — SPERM - POST VASECTOMY: NORMAL

## 2022-03-11 PROCEDURE — 99024 POSTOP FOLLOW-UP VISIT: CPT | Performed by: UROLOGY

## 2022-03-11 PROCEDURE — 89321 SEMEN ANAL SPERM DETECTION: CPT | Performed by: UROLOGY

## 2022-03-11 NOTE — PROGRESS NOTES
Follow Up Office Visit      Patient Name: Juan Antonio Smiley  : 1980   MRN: [id number]     Chief Complaint:    Chief Complaint   Patient presents with   • Post-op Follow-up     VASECTOMY       Referring Provider: No ref. provider found    History of Present Illness: Juan Antonio Smiley is a 42 y.o. male who presents today for follow up after vasectomy.  He reports overall he has done well.  He has some occasional soreness if he gets hit.  Otherwise, he is doing well.  No issues voiding/sexual activity.   He did not provide a semen sample as yet.  He will bring one in today or on Monday.     Subjective      Review of System: Review of Systems   Constitutional: Negative for fatigue and fever.   HENT: Negative for hearing loss and sinus pressure.    Respiratory: Negative for shortness of breath and stridor.    Cardiovascular: Negative for chest pain and palpitations.   Gastrointestinal: Negative for abdominal pain and diarrhea.   Genitourinary: Negative for hematuria and urgency.   Musculoskeletal: Negative for back pain.   Neurological: Negative for numbness.   Psychiatric/Behavioral: Negative for agitation.      I have reviewed the ROS documented by my clinical staff, I have updated appropriately and I agree. Kaz Solis MD    I have reviewed and the following portions of the patient's history were updated as appropriate: past family history, past medical history, past social history, past surgical history and problem list.    Past Medical History:   Past Medical History:   Diagnosis Date   • Anxiety    • History of COVID-19    • Hypertension        Past Surgical History:  Past Surgical History:   Procedure Laterality Date   • VASECTOMY Bilateral 2022    Procedure: VASECTOMY;  Surgeon: Kaz Solis MD;  Location: [facility];  Service: Urology;  Laterality: Bilateral;   • WISDOM TOOTH EXTRACTION         Family History:   family history includes Cancer in an other family member; Diabetes in an other family  "member; Heart disease in an other family member; Hypertension in an other family member.   Otherwise pertinent FHx was reviewed and not pertinent to current issue.    Social History:    reports that he has never smoked. He has quit using smokeless tobacco.  His smokeless tobacco use included chew. He reports that he does not drink alcohol and does not use drugs.    Medications:     Current Outpatient Medications:   •  lisinopril (PRINIVIL,ZESTRIL) 10 MG tablet, Take 10 mg by mouth Every Night., Disp: , Rfl:   •  lisinopril (PRINIVIL,ZESTRIL) 10 MG tablet, Take 1 tablet by mouth Daily., Disp: , Rfl:   •  oxyCODONE-acetaminophen (PERCOCET) 5-325 MG per tablet, Take 1 tablet by mouth Every 6 (Six) Hours As Needed for Severe Pain ., Disp: 15 tablet, Rfl: 0    Allergies:   No Known Allergies      Objective     Physical Exam:   Vital Signs:   Vitals:    03/11/22 0903   Weight: 102 kg (224 lb 13.9 oz)   Height: 177.8 cm (70\")     Body mass index is 32.27 kg/m².     Physical Exam  Vitals and nursing note reviewed.   Constitutional:       General: He is awake. He is not in acute distress.     Appearance: Normal appearance. He is well-developed.   HENT:      Head: Normocephalic and atraumatic.      Right Ear: External ear normal.      Left Ear: External ear normal.      Nose: Nose normal.   Eyes:      Conjunctiva/sclera: Conjunctivae normal.   Pulmonary:      Effort: Pulmonary effort is normal.   Abdominal:      General: There is no distension.      Palpations: Abdomen is soft. There is no mass.      Tenderness: There is no abdominal tenderness. There is no right CVA tenderness, left CVA tenderness, guarding or rebound.      Hernia: No hernia is present. There is no hernia in the left inguinal area or right inguinal area.   Genitourinary:     Pubic Area: No rash.       Penis: Normal.       Testes: Normal.      Rectum: No mass or tenderness. Normal anal tone.      Comments: Normal testis/cord  NTTP  Musculoskeletal:      " Cervical back: Normal range of motion.   Lymphadenopathy:      Lower Body: No right inguinal adenopathy. No left inguinal adenopathy.   Skin:     General: Skin is warm.   Neurological:      General: No focal deficit present.      Mental Status: He is alert and oriented to person, place, and time.   Psychiatric:         Behavior: Behavior normal. Behavior is cooperative.         Labs:   Brief Urine Lab Results     None               Lab Results   Component Value Date    GLUCOSE 132 (H) 06/03/2017    CALCIUM 9.9 06/03/2017     06/03/2017    K 4.3 06/03/2017    CO2 26.8 06/03/2017     06/03/2017    BUN 15 06/03/2017    CREATININE 1.19 06/03/2017    EGFRIFNONA 70 06/03/2017    BCR 12.6 06/03/2017    ANIONGAP 5.2 06/03/2017       Lab Results   Component Value Date    WBC 11.00 06/03/2017    HGB 16.7 06/03/2017    HCT 49.4 06/03/2017    MCV 86.5 06/03/2017     06/03/2017       No results found for: PSA    Images:   No Images in the past 120 days found..    Pathology:  Normal cross section b/l vas    Measures:   Tobacco:   Juan Antonio Smiley  reports that he has never smoked. He has quit using smokeless tobacco.  His smokeless tobacco use included chew..    Urine Incontinence: Patient reports that he is not currently experiencing any symptoms of urinary incontinence.      Assessment / Plan      Assessment/Plan:   42 y.o. male who presented today for follow up after vasectomy.  He reports overall he has done well.   I provided him with a specimen cup with instructions to drop a semen sample off when it is convenient for him to do so.  The office will call him with results.  He will follow up PRN.     Diagnoses and all orders for this visit:    1. Sterilization (Primary)           Follow Up:   Return if symptoms worsen or fail to improve.    I spent approximately 15 minutes providing clinical care for this patient; including review of patient's chart and provider documentation, face to face time spent with  patient in examination room (obtaining history, performing physical exam, discussing diagnosis and management options), placing orders, and completing patient documentation.     Kaz Solis MD  [facility] [place] [facility]

## 2022-07-20 ENCOUNTER — OFFICE VISIT (OUTPATIENT)
Dept: CARDIOLOGY | Facility: HOSPITAL | Age: 42
End: 2022-07-20

## 2022-07-20 DIAGNOSIS — I10 PRIMARY HYPERTENSION: ICD-10-CM

## 2022-07-20 DIAGNOSIS — R00.2 PALPITATIONS: Primary | ICD-10-CM

## 2022-07-20 PROCEDURE — 99204 OFFICE O/P NEW MOD 45 MIN: CPT | Performed by: NURSE PRACTITIONER

## 2022-07-20 RX ORDER — AMLODIPINE BESYLATE 10 MG/1
10 TABLET ORAL DAILY
Qty: 30 TABLET | Refills: 11 | Status: SHIPPED | OUTPATIENT
Start: 2022-07-20

## 2022-07-20 RX ORDER — AMLODIPINE BESYLATE 5 MG/1
5 TABLET ORAL NIGHTLY
COMMUNITY
Start: 2022-06-30 | End: 2022-07-20

## 2022-07-20 RX ORDER — BUSPIRONE HYDROCHLORIDE 10 MG/1
10 TABLET ORAL 2 TIMES DAILY
COMMUNITY
Start: 2022-07-01

## 2022-07-21 ENCOUNTER — HOSPITAL ENCOUNTER (OUTPATIENT)
Dept: CARDIOLOGY | Facility: HOSPITAL | Age: 42
Discharge: HOME | End: 2022-07-21
Admitting: NURSE PRACTITIONER

## 2022-07-21 VITALS
TEMPERATURE: 97.4 F | DIASTOLIC BLOOD PRESSURE: 98 MMHG | BODY MASS INDEX: 31.85 KG/M2 | WEIGHT: 222.5 LBS | HEIGHT: 70 IN | SYSTOLIC BLOOD PRESSURE: 148 MMHG | OXYGEN SATURATION: 96 % | HEART RATE: 73 BPM | RESPIRATION RATE: 18 BRPM

## 2022-07-21 DIAGNOSIS — I10 PRIMARY HYPERTENSION: ICD-10-CM

## 2022-07-21 DIAGNOSIS — R00.2 PALPITATIONS: ICD-10-CM

## 2022-07-21 LAB
BH CV ECHO MEAS - ACS: 2.6 CM
BH CV ECHO MEAS - AO MAX PG: 6.6 MMHG
BH CV ECHO MEAS - AO MEAN PG: 3 MMHG
BH CV ECHO MEAS - AO ROOT DIAM: 3.2 CM
BH CV ECHO MEAS - AO V2 MAX: 128 CM/SEC
BH CV ECHO MEAS - AO V2 VTI: 24.8 CM
BH CV ECHO MEAS - EDV(CUBED): 74.1 ML
BH CV ECHO MEAS - EDV(MOD-SP4): 62.1 ML
BH CV ECHO MEAS - EF(MOD-SP4): 52.5 %
BH CV ECHO MEAS - ESV(CUBED): 46.7 ML
BH CV ECHO MEAS - ESV(MOD-SP4): 29.5 ML
BH CV ECHO MEAS - FS: 14.3 %
BH CV ECHO MEAS - IVS/LVPW: 0.83 CM
BH CV ECHO MEAS - IVSD: 1 CM
BH CV ECHO MEAS - LA DIMENSION: 2.6 CM
BH CV ECHO MEAS - LAT PEAK E' VEL: 10.8 CM/SEC
BH CV ECHO MEAS - LV DIASTOLIC VOL/BSA (35-75): 28.5 CM2
BH CV ECHO MEAS - LV MASS(C)D: 157.1 GRAMS
BH CV ECHO MEAS - LV SYSTOLIC VOL/BSA (12-30): 13.5 CM2
BH CV ECHO MEAS - LVIDD: 4.2 CM
BH CV ECHO MEAS - LVIDS: 3.6 CM
BH CV ECHO MEAS - LVOT AREA: 3.8 CM2
BH CV ECHO MEAS - LVOT DIAM: 2.2 CM
BH CV ECHO MEAS - LVPWD: 1.2 CM
BH CV ECHO MEAS - MED PEAK E' VEL: 8.9 CM/SEC
BH CV ECHO MEAS - MV A MAX VEL: 71 CM/SEC
BH CV ECHO MEAS - MV E MAX VEL: 108 CM/SEC
BH CV ECHO MEAS - MV E/A: 1.52
BH CV ECHO MEAS - PA ACC TIME: 0.1 SEC
BH CV ECHO MEAS - PA PR(ACCEL): 36.3 MMHG
BH CV ECHO MEAS - SI(MOD-SP4): 14.9 ML/M2
BH CV ECHO MEAS - SV(MOD-SP4): 32.6 ML
BH CV ECHO MEAS - TAPSE (>1.6): 2.03 CM
BH CV ECHO MEASUREMENTS AVERAGE E/E' RATIO: 10.96
LEFT ATRIUM VOLUME INDEX: 17.2 ML/M2
MAXIMAL PREDICTED HEART RATE: 180 BPM
STRESS TARGET HR: 153 BPM

## 2022-07-21 PROCEDURE — 93306 TTE W/DOPPLER COMPLETE: CPT | Performed by: INTERNAL MEDICINE

## 2022-07-21 PROCEDURE — 93306 TTE W/DOPPLER COMPLETE: CPT

## 2022-07-21 NOTE — PROGRESS NOTES
"Chief Complaint  Establish Care (Irregular heart beat/)    Subjective    History of Present Illness {CC  Problem List  Visit  Diagnosis   Encounters  Notes  Medications  Labs  Result Review Imaging  Media :23}       History of Present Illness   42-year-old male presents to the office today at the request of his primary care provider for ongoing evaluation of his hypertension irregular heartbeat.  Patient reports that he has been monitoring his heart rate for the past few months and has noted that it is somewhat erratic.  Heart rates will be in the 40s and then in the 1 teens to 130s.  Patient does report noting that he can feel his heart racing.  He reports he is under a lot of stress as he is a .  Also reports her blood pressure has been elevated even on the Norvasc.  Patient currently denies chest pain, dyspnea, presyncope, syncope, pedal edema.  History of anxiety, COVID-19, hypertension.     Objective     Vital Signs:   Vitals:    07/20/22 1237 07/20/22 1239 07/20/22 1240 07/20/22 1255   BP: (!) 155/109 (!) 152/106 (!) 148/106 148/98   BP Location: Right arm Left arm Left arm Left arm   Patient Position: Sitting Standing Sitting Sitting   Cuff Size: Adult Adult Adult    Pulse: 79 86 73    Resp:   18    Temp:   97.4 °F (36.3 °C)    TempSrc:   Temporal    SpO2: 97% 97% 96%    Weight:   101 kg (222 lb 8 oz)    Height:   177.8 cm (70\")      Body mass index is 31.93 kg/m².  Physical Exam  Vitals and nursing note reviewed.   Constitutional:       Appearance: Normal appearance.   HENT:      Head: Normocephalic.   Eyes:      Pupils: Pupils are equal, round, and reactive to light.   Cardiovascular:      Rate and Rhythm: Normal rate and regular rhythm.      Pulses: Normal pulses.      Heart sounds: Normal heart sounds. No murmur heard.  Pulmonary:      Effort: Pulmonary effort is normal.      Breath sounds: Normal breath sounds.   Abdominal:      General: Bowel sounds are normal.      Palpations: " Abdomen is soft.   Musculoskeletal:         General: Normal range of motion.      Cervical back: Normal range of motion.      Right lower leg: No edema.      Left lower leg: No edema.   Skin:     General: Skin is warm and dry.      Capillary Refill: Capillary refill takes less than 2 seconds.   Neurological:      Mental Status: He is alert and oriented to person, place, and time.   Psychiatric:         Mood and Affect: Mood normal.         Thought Content: Thought content normal.              Result Review  Data Reviewed:{ Labs  Result Review  Imaging  Med Tab  Media :23}     48-hour Holter showed an average heart rate of 102 bpm with minimum heart rate of 43 bpm at 1:26 AM and a maximum heart rate 174 bpm, rare PACs.  1 episode of narrow complex tachycardia at 174 bpm likely sinus tachycardia.           Assessment and Plan {CC Problem List  Visit Diagnosis  ROS  Review (Popup)  Health Maintenance  Quality  BestPractice  Medications  SmartSets  SnapShot Encounters  Media :23}   1. Palpitations  Low burden on recent extended Holter   normal heart rate variability    - Adult Transthoracic Echo Complete W/ Cont if Necessary Per Protocol; Future    2. Primary hypertension  Increase Norvasc to 10 mg daily  Continue monitoring blood pressure closely  - Adult Transthoracic Echo Complete W/ Cont if Necessary Per Protocol; Future          Follow Up {Instructions Charge Capture  Follow-up Communications :23}   Return in about 3 weeks (around 8/10/2022) for Telemedicine visit, HTN.    Patient was given instructions and counseling regarding his condition or for health maintenance advice. Please see specific information pulled into the AVS if appropriate.  Patient was instructed to call the [facility] with any questions, concerns, or worsening symptoms.

## 2022-07-22 NOTE — PROGRESS NOTES
Your echo shows normal function with ef of 61-65% and mild calcification of leaflets of mitral valve. Overall acceptable echo.

## 2022-08-11 ENCOUNTER — TELEMEDICINE (OUTPATIENT)
Dept: CARDIOLOGY | Facility: HOSPITAL | Age: 42
End: 2022-08-11

## 2022-08-11 DIAGNOSIS — I10 PRIMARY HYPERTENSION: ICD-10-CM

## 2022-08-11 DIAGNOSIS — R00.2 PALPITATIONS: Primary | ICD-10-CM

## 2022-08-11 PROCEDURE — 99213 OFFICE O/P EST LOW 20 MIN: CPT | Performed by: NURSE PRACTITIONER

## 2022-08-20 VITALS
SYSTOLIC BLOOD PRESSURE: 122 MMHG | HEIGHT: 70 IN | WEIGHT: 222 LBS | BODY MASS INDEX: 31.78 KG/M2 | DIASTOLIC BLOOD PRESSURE: 78 MMHG

## 2022-08-20 NOTE — PROGRESS NOTES
"Chief Complaint  Follow-up (Palpitations, htn)    Subjective    History of Present Illness {CC  Problem List  Visit  Diagnosis   Encounters  Notes  Medications  Labs  Result Review Imaging  Media :23}     You have chosen to receive care through the use of telemedicine. Telemedicine enables health care providers at different locations to provide safe, effective, and convenient care through the use of technology. As with any health care service, there are risks associated with the use of telemedicine, including equipment failure, poor connections, and  issues.    • Do you understand the risks and benefits of telemedicine as I have explained them to you? Yes  • Have your questions regarding telemedicine been answered? Yes  • Do you consent to the use of telemedicine in your medical care today? Yes    History of Present Illness   42 year old male with telehealth visit for ongoing evaluation of his palpitations and htn. Patient notes that he is feeling significantly better and notes that his palpitations are occurring infrequently now. Notes that bp is well controlled with bp of 120-130s. Currently denies cp, dyspnea, dizziness, presyncope, syncope, orthopnea, pnd or pedal edema.   Objective     Vital Signs:   Vitals:    08/11/22 1430   BP: 122/78   BP Location: Left arm   Patient Position: Sitting   Weight: 101 kg (222 lb)   Height: 177.8 cm (70\")     Body mass index is 31.85 kg/m².  Physical Exam  Vitals and nursing note reviewed.   Constitutional:       Appearance: Normal appearance.   HENT:      Head: Normocephalic.   Pulmonary:      Effort: Pulmonary effort is normal.   Neurological:      Mental Status: He is alert and oriented to person, place, and time.   Psychiatric:         Mood and Affect: Mood normal.         Behavior: Behavior normal.         Thought Content: Thought content normal.              Result Review  Data Reviewed:{ Labs  Result Review  Imaging  Med Tab  Media :23} "     Adult Transthoracic Echo Complete W/ Cont if Necessary Per Protocol (07/21/2022 11:00)             Assessment and Plan {CC Problem List  Visit Diagnosis  ROS  Review (Popup)  Health Maintenance  Quality  BestPractice  Medications  SmartSets  SnapShot Encounters  Media :23}   1. Palpitations  Improved  Avoid caffeine, otc decongestants     2. Primary hypertension  Under better control on norvasc   Continue to monitor closely        Follow Up {Instructions Charge Capture  Follow-up Communications :23}   Return if symptoms worsen or fail to improve.    Patient was given instructions and counseling regarding his condition or for health maintenance advice. Please see specific information pulled into the AVS if appropriate.  Patient was instructed to call the [facility] with any questions, concerns, or worsening symptoms.

## 2023-08-16 RX ORDER — AMLODIPINE BESYLATE 10 MG/1
TABLET ORAL
Qty: 30 TABLET | Refills: 11 | OUTPATIENT
Start: 2023-08-16

## 2023-10-26 ENCOUNTER — DOCUMENTATION (OUTPATIENT)
Dept: INFECTIOUS DISEASES | Facility: CLINIC | Age: 43
End: 2023-10-26
Payer: COMMERCIAL

## (undated) DEVICE — NDL HYPO ECLPS SFTY 25G 1 1/2IN

## (undated) DEVICE — HOLDER: Brand: DEROYAL

## (undated) DEVICE — GOWN,REINF,POLY,ECL,PP SLV,XXL: Brand: MEDLINE

## (undated) DEVICE — PK BASIC 70

## (undated) DEVICE — SUT GUT CHRM 3/0 SH 27IN G122H

## (undated) DEVICE — TRY SKINPREP PVP SCRB W PAINT

## (undated) DEVICE — ADHS SKIN PREMIERPRO EXOFIN TOPICAL HI/VISC .5ML

## (undated) DEVICE — DRAPE,LAPAROTOMY,PED,STERILE: Brand: MEDLINE

## (undated) DEVICE — SPNG GZ WOVN 4X4IN 12PLY 10/BX STRL

## (undated) DEVICE — ELECTRD NDL EZ CLN MOD 2.75IN

## (undated) DEVICE — GLV SURG PREMIERPRO MIC LTX PF SZ7 BRN